# Patient Record
Sex: FEMALE | Race: WHITE | Employment: FULL TIME | URBAN - METROPOLITAN AREA
[De-identification: names, ages, dates, MRNs, and addresses within clinical notes are randomized per-mention and may not be internally consistent; named-entity substitution may affect disease eponyms.]

---

## 2018-09-26 ENCOUNTER — OFFICE VISIT (OUTPATIENT)
Dept: URGENT CARE | Facility: URGENT CARE | Age: 53
End: 2018-09-26
Payer: COMMERCIAL

## 2018-09-26 ENCOUNTER — RADIANT APPOINTMENT (OUTPATIENT)
Dept: GENERAL RADIOLOGY | Facility: CLINIC | Age: 53
End: 2018-09-26
Attending: PHYSICIAN ASSISTANT
Payer: COMMERCIAL

## 2018-09-26 VITALS
DIASTOLIC BLOOD PRESSURE: 82 MMHG | OXYGEN SATURATION: 100 % | WEIGHT: 110 LBS | HEART RATE: 69 BPM | SYSTOLIC BLOOD PRESSURE: 118 MMHG

## 2018-09-26 DIAGNOSIS — M25.532 LEFT WRIST PAIN: ICD-10-CM

## 2018-09-26 DIAGNOSIS — S62.102A WRIST FRACTURE, LEFT, CLOSED, INITIAL ENCOUNTER: Primary | ICD-10-CM

## 2018-09-26 PROCEDURE — 99204 OFFICE O/P NEW MOD 45 MIN: CPT | Performed by: PHYSICIAN ASSISTANT

## 2018-09-26 PROCEDURE — 73110 X-RAY EXAM OF WRIST: CPT | Mod: LT

## 2018-09-26 RX ORDER — HYDROCODONE BITARTRATE AND ACETAMINOPHEN 5; 325 MG/1; MG/1
1 TABLET ORAL EVERY 4 HOURS PRN
Qty: 8 TABLET | Refills: 0 | Status: SHIPPED | OUTPATIENT
Start: 2018-09-26

## 2018-09-26 NOTE — PATIENT INSTRUCTIONS
Buckle (Torus) Fracture of an Arm  Your child has a broken bone (fracture) in the forearm (radius or ulna bone). This is a very common fracture in children. Because of a child s softer bones, one side of the bone might buckle or bend without any break in the other side. This injury is also called an incomplete fracture for this reason. It s also called a torus fracture.  These fractures heal faster than complete fractures. But your child will need to wear a splint or cast for at least 3 weeks. It may take 6 to 8 weeks for the fracture to heal.    Home care  Follow these guidelines when caring for your child at home:    Your child will be given a splint or cast to keep the arm from moving. Keep your child's arm elevated to reduce pain and swelling. When your child is sitting or lying down, keep the arm above heart level. You can do this by placing the arm on a pillow that rests on your child s chest or on a pillow at your child's side. This is most important during the first 2 days (48 hours) after the injury. Be sure that the pillows do not move near the face of the infant or toddler. Never leave your child unsupervised.    Put an ice pack on the injured area. Do this for 20 minutes every 1 to 2 hours the first day for pain relief. You can make an ice pack by wrapping a plastic bag of ice cubes in a thin towel. As the ice melts, be careful that the cast or splint doesn t get wet. You can put the ice pack inside the sling and directly over the splint or cast. Continue using the ice pack 3 to 4 times a day for the next 2 days. Then use the ice pack as needed to ease pain and swelling.    Keep the cast or splint completely dry at all times. Have your child bathe with the cast or splint out of the water. Protect it with a large plastic bag, taped or rubber-banded at the top end. If a fiberglass cast or splint gets wet, you can dry it with a hair dryer on the cool setting.    You may give your child acetaminophen or  ibuprofen to control pain, unless another pain medicine was prescribed. If your child has chronic liver or kidney disease, talk with the healthcare provider before using these medicines. Also talk with the provider if your child has had a stomach ulcer or gastrointestinal bleeding. Don t give ibuprofen to a child younger than 6 months of age.    Don t put creams, lotions, or objects under the cast.  Follow-up care  Follow up with your child s healthcare provider, or as advised.This is to make sure the bone is healing the way it should. If your child was given a splint, it may be changed to a cast at the follow-up visit.  When to seek medical advice  Call your child s healthcare provider right away if any of these occur:    The cast or splint cracks    The plaster cast or splint becomes wet or soft    The fiberglass cast or splint stays wet for more than 24 hours    Tightness or pain under the cast or splint gets worse    Fingers become swollen, cold, blue, numb, or tingly    Your child can t move the fingers on the injured arm    Skin around cast becomes red, swollen, or irritated  Also call your child s provider right away if your child has a fever (see Fever and children, below)     Fever and children  Always use a digital thermometer to check your child s temperature. Never use a mercury thermometer.  For infants and toddlers, be sure to use a rectal thermometer correctly. A rectal thermometer may accidentally poke a hole in (perforate) the rectum. It may also pass on germs from the stool. Always follow the product maker s directions for proper use. If you don t feel comfortable taking a rectal temperature, use another method. When you talk to your child s healthcare provider, tell him or her which method you used to take your child s temperature.  Here are guidelines for fever temperature. Ear temperatures aren t accurate before 6 months of age. Don t take an oral temperature until your child is at least 4 years  old.  Infant under 3 months old:    Ask your child s healthcare provider how you should take the temperature.    Rectal or forehead (temporal artery) temperature of 100.4 F (38 C) or higher, or as directed by the provider    Armpit temperature of 99 F (37.2 C) or higher, or as directed by the provider  Child age 3 to 36 months:    Rectal, forehead (temporal artery), or ear temperature of 102 F (38.9 C) or higher, or as directed by the provider    Armpit temperature of 101 F (38.3 C) or higher, or as directed by the provider  Child of any age:    Repeated temperature of 104 F (40 C) or higher, or as directed by the provider    Fever that lasts more than 24 hours in a child under 2 years old. Or a fever that lasts for 3 days in a child 2 years or older.      Date Last Reviewed: 5/1/2017 2000-2017 The "[employer]. [address] [place] [address]. All rights reserved. This information is not intended as a substitute for professional medical care. Always follow your healthcare professional's instructions.

## 2018-09-26 NOTE — PROGRESS NOTES
SUBJECTIVE:  Chief Complaint   Patient presents with     Urgent Care     Wrist Injury     Pt states collided with someone on the tennis court and fell on left wrist.      Tiffanie Alcala is a 53 year old female presents with a chief complaint of left wrist pain, swelling, tenderness and decreased range of motion.  The injury occurred 1 hour(s) ago.   The injury happened while playing tennis. Another player slammed into the patient knocking her to the ground, FOOSH with other players weight on her.  The patient complained of moderate pain  and has had decreased ROM.  Pain exacerbated by movment.  Relieved by rest.  She treated it initially with no therapy. This is the first time this type of injury has occurred to this patient.     Personal and family hisotry of osteoarthritis      No past medical history on file.  No current outpatient prescriptions on file.     Social History   Substance Use Topics     Smoking status: Never Smoker     Smokeless tobacco: Never Used     Alcohol use Not on file       ROS:  Review of systems negative except as stated above.    EXAM:   /82 (BP Location: Right arm, Patient Position: Chair, Cuff Size: Adult Regular)  Pulse 69  Wt 110 lb (49.9 kg)  SpO2 100%  Gen: healthy,alert,no distress  Extremity: tenderness, swelling at distal radius.  Digit ROM and elbow ROM intact.  Pain with f/e/s/p of wrist  GENERAL APPEARANCE: healthy, alert and no distress  CHEST: clear to auscultation  CV: regular rate and rhythm  EXTREMITIES: peripheral pulses normal  SKIN: no suspicious lesions or rashes  NEURO: Normal strength and tone, sensory exam grossly normal, mentation intact and speech normal    X-RAY indicates torus fracture of distal radius    ASSESSMENT:   (M25.532) Left wrist pain  (primary encounter diagnosis)  Plan: XR Wrist Left G/E 3 Views, order for DME      (S62.102A) Wrist fracture, left, closed, initial encounter  Plan: order for DME, ORTHO  REFERRAL,          HYDROcodone-acetaminophen (NORCO) 5-325 MG per         tablet      Patient Instructions       Buckle (Torus) Fracture of an Arm  Your child has a broken bone (fracture) in the forearm (radius or ulna bone). This is a very common fracture in children. Because of a child s softer bones, one side of the bone might buckle or bend without any break in the other side. This injury is also called an incomplete fracture for this reason. It s also called a torus fracture.  These fractures heal faster than complete fractures. But your child will need to wear a splint or cast for at least 3 weeks. It may take 6 to 8 weeks for the fracture to heal.    Home care  Follow these guidelines when caring for your child at home:    Your child will be given a splint or cast to keep the arm from moving. Keep your child's arm elevated to reduce pain and swelling. When your child is sitting or lying down, keep the arm above heart level. You can do this by placing the arm on a pillow that rests on your child s chest or on a pillow at your child's side. This is most important during the first 2 days (48 hours) after the injury. Be sure that the pillows do not move near the face of the infant or toddler. Never leave your child unsupervised.    Put an ice pack on the injured area. Do this for 20 minutes every 1 to 2 hours the first day for pain relief. You can make an ice pack by wrapping a plastic bag of ice cubes in a thin towel. As the ice melts, be careful that the cast or splint doesn t get wet. You can put the ice pack inside the sling and directly over the splint or cast. Continue using the ice pack 3 to 4 times a day for the next 2 days. Then use the ice pack as needed to ease pain and swelling.    Keep the cast or splint completely dry at all times. Have your child bathe with the cast or splint out of the water. Protect it with a large plastic bag, taped or rubber-banded at the top end. If a fiberglass cast or splint gets wet, you can  dry it with a hair dryer on the cool setting.    You may give your child acetaminophen or ibuprofen to control pain, unless another pain medicine was prescribed. If your child has chronic liver or kidney disease, talk with the healthcare provider before using these medicines. Also talk with the provider if your child has had a stomach ulcer or gastrointestinal bleeding. Don t give ibuprofen to a child younger than 6 months of age.    Don t put creams, lotions, or objects under the cast.  Follow-up care  Follow up with your child s healthcare provider, or as advised.This is to make sure the bone is healing the way it should. If your child was given a splint, it may be changed to a cast at the follow-up visit.  When to seek medical advice  Call your child s healthcare provider right away if any of these occur:    The cast or splint cracks    The plaster cast or splint becomes wet or soft    The fiberglass cast or splint stays wet for more than 24 hours    Tightness or pain under the cast or splint gets worse    Fingers become swollen, cold, blue, numb, or tingly    Your child can t move the fingers on the injured arm    Skin around cast becomes red, swollen, or irritated  Also call your child s provider right away if your child has a fever (see Fever and children, below)     Fever and children  Always use a digital thermometer to check your child s temperature. Never use a mercury thermometer.  For infants and toddlers, be sure to use a rectal thermometer correctly. A rectal thermometer may accidentally poke a hole in (perforate) the rectum. It may also pass on germs from the stool. Always follow the product maker s directions for proper use. If you don t feel comfortable taking a rectal temperature, use another method. When you talk to your child s healthcare provider, tell him or her which method you used to take your child s temperature.  Here are guidelines for fever temperature. Ear temperatures aren t accurate  before 6 months of age. Don t take an oral temperature until your child is at least 4 years old.  Infant under 3 months old:    Ask your child s healthcare provider how you should take the temperature.    Rectal or forehead (temporal artery) temperature of 100.4 F (38 C) or higher, or as directed by the provider    Armpit temperature of 99 F (37.2 C) or higher, or as directed by the provider  Child age 3 to 36 months:    Rectal, forehead (temporal artery), or ear temperature of 102 F (38.9 C) or higher, or as directed by the provider    Armpit temperature of 101 F (38.3 C) or higher, or as directed by the provider  Child of any age:    Repeated temperature of 104 F (40 C) or higher, or as directed by the provider    Fever that lasts more than 24 hours in a child under 2 years old. Or a fever that lasts for 3 days in a child 2 years or older.      Date Last Reviewed: 5/1/2017 2000-2017 [employer]. [address] [place] [address], [place] [address]. All rights reserved. This information is not intended as a substitute for professional medical care. Always follow your healthcare professional's instructions.

## 2018-09-26 NOTE — MR AVS SNAPSHOT
After Visit Summary   9/26/2018    Tiffanie Alcala    MRN: [id number]           Patient Information     Date Of Birth          1965        Visit Information        Provider Department      9/26/2018 12:00 PM Gaetano Arita PA-C [facility]        Today's Diagnoses     Left wrist pain    -  1    Wrist fracture, left, closed, initial encounter          Care Instructions      Buckle (Torus) Fracture of an Arm  Your child has a broken bone (fracture) in the forearm (radius or ulna bone). This is a very common fracture in children. Because of a child s softer bones, one side of the bone might buckle or bend without any break in the other side. This injury is also called an incomplete fracture for this reason. It s also called a torus fracture.  These fractures heal faster than complete fractures. But your child will need to wear a splint or cast for at least 3 weeks. It may take 6 to 8 weeks for the fracture to heal.    Home care  Follow these guidelines when caring for your child at home:    Your child will be given a splint or cast to keep the arm from moving. Keep your child's arm elevated to reduce pain and swelling. When your child is sitting or lying down, keep the arm above heart level. You can do this by placing the arm on a pillow that rests on your child s chest or on a pillow at your child's side. This is most important during the first 2 days (48 hours) after the injury. Be sure that the pillows do not move near the face of the infant or toddler. Never leave your child unsupervised.    Put an ice pack on the injured area. Do this for 20 minutes every 1 to 2 hours the first day for pain relief. You can make an ice pack by wrapping a plastic bag of ice cubes in a thin towel. As the ice melts, be careful that the cast or splint doesn t get wet. You can put the ice pack inside the sling and directly over the splint or cast. Continue using the ice pack 3  to 4 times a day for the next 2 days. Then use the ice pack as needed to ease pain and swelling.    Keep the cast or splint completely dry at all times. Have your child bathe with the cast or splint out of the water. Protect it with a large plastic bag, taped or rubber-banded at the top end. If a fiberglass cast or splint gets wet, you can dry it with a hair dryer on the cool setting.    You may give your child acetaminophen or ibuprofen to control pain, unless another pain medicine was prescribed. If your child has chronic liver or kidney disease, talk with the healthcare provider before using these medicines. Also talk with the provider if your child has had a stomach ulcer or gastrointestinal bleeding. Don t give ibuprofen to a child younger than 6 months of age.    Don t put creams, lotions, or objects under the cast.  Follow-up care  Follow up with your child s healthcare provider, or as advised.This is to make sure the bone is healing the way it should. If your child was given a splint, it may be changed to a cast at the follow-up visit.  When to seek medical advice  Call your child s healthcare provider right away if any of these occur:    The cast or splint cracks    The plaster cast or splint becomes wet or soft    The fiberglass cast or splint stays wet for more than 24 hours    Tightness or pain under the cast or splint gets worse    Fingers become swollen, cold, blue, numb, or tingly    Your child can t move the fingers on the injured arm    Skin around cast becomes red, swollen, or irritated  Also call your child s provider right away if your child has a fever (see Fever and children, below)     Fever and children  Always use a digital thermometer to check your child s temperature. Never use a mercury thermometer.  For infants and toddlers, be sure to use a rectal thermometer correctly. A rectal thermometer may accidentally poke a hole in (perforate) the rectum. It may also pass on germs from the stool.  Always follow the product maker s directions for proper use. If you don t feel comfortable taking a rectal temperature, use another method. When you talk to your child s healthcare provider, tell him or her which method you used to take your child s temperature.  Here are guidelines for fever temperature. Ear temperatures aren t accurate before 6 months of age. Don t take an oral temperature until your child is at least 4 years old.  Infant under 3 months old:    Ask your child s healthcare provider how you should take the temperature.    Rectal or forehead (temporal artery) temperature of 100.4 F (38 C) or higher, or as directed by the provider    Armpit temperature of 99 F (37.2 C) or higher, or as directed by the provider  Child age 3 to 36 months:    Rectal, forehead (temporal artery), or ear temperature of 102 F (38.9 C) or higher, or as directed by the provider    Armpit temperature of 101 F (38.3 C) or higher, or as directed by the provider  Child of any age:    Repeated temperature of 104 F (40 C) or higher, or as directed by the provider    Fever that lasts more than 24 hours in a child under 2 years old. Or a fever that lasts for 3 days in a child 2 years or older.      Date Last Reviewed: 5/1/2017 2000-2017 [employer]. [address] [place] [address], [place] [address]. All rights reserved. This information is not intended as a substitute for professional medical care. Always follow your healthcare professional's instructions.                Follow-ups after your visit        Additional Services     ORTHO  REFERRAL       [facility] is referring you to the Orthopedic  Services at [facility].       The  Representative will assist you in the coordination of your Orthopedic and Musculoskeletal Care as prescribed by your physician.    The  Representative will call you within 1 business day to help schedule your appointment, or  "you may contact the [facility] Representative at:    All areas ~ [phone]     Type of Referral : Surgical / Specialist       Timeframe requested: 3 - 5 days    Coverage of these services is subject to the terms and limitations of your health insurance plan.  Please call member services at your health plan with any benefit or coverage questions.      If X-rays, CT or MRI's have been performed, please contact the facility where they were done to arrange for , prior to your scheduled appointment.  Please bring this referral request to your appointment and present it to your specialist.                  Who to contact     If you have questions or need follow up information about today's clinic visit or your schedule please contact [facility] directly at [phone].  Normal or non-critical lab and imaging results will be communicated to you by [employer], letter or phone within 4 business days after the clinic has received the results. If you do not hear from us within 7 days, please contact the clinic through [employer] or phone. If you have a critical or abnormal lab result, we will notify you by phone as soon as possible.  Submit refill requests through [employer] or call your pharmacy and they will forward the refill request to us. Please allow 3 business days for your refill to be completed.          Additional Information About Your Visit        [employer] Information     [employer] lets you send messages to your doctor, view your test results, renew your prescriptions, schedule appointments and more. To sign up, go to [email or web address] . Click on \"Log in\" on the left side of the screen, which will take you to the Welcome page. Then click on \"Sign up Now\" on the right side of the page.     You will be asked to enter the access code listed below, as well as some personal information. Please follow the directions to create your username and password.     Your access code is: " [email or web address]  Expires: 2018 12:48 PM     Your access code will  in 90 days. If you need help or a new code, please call your [place] clinic or [phone].        Care EveryWhere ID     This is your Care EveryWhere ID. This could be used by other organizations to access your [facility] medical records  [id number]        Your Vitals Were     Pulse Pulse Oximetry                69 100%           Blood Pressure from Last 3 Encounters:   18 118/82    Weight from Last 3 Encounters:   18 110 lb (49.9 kg)              We Performed the Following     ORTHO  REFERRAL          Today's Medication Changes          These changes are accurate as of 18 12:48 PM.  If you have any questions, ask your nurse or doctor.               Start taking these medicines.        Dose/Directions    order for DME   Used for:  Left wrist pain, Wrist fracture, left, closed, initial encounter   Started by:  Gaetano Arita PA-C        Equipment being ordered: wrist splint   Quantity:  1 Device   Refills:  0            Where to get your medicines      Some of these will need a paper prescription and others can be bought over the counter.  Ask your nurse if you have questions.     Bring a paper prescription for each of these medications     order for DME                Primary Care Provider Fax #    Nikhil Benedictet [phone]       No address on file        Equal Access to Services     SERGIO LIPSCOMB AH: Hadii aad ku hadasho Soomaali, waaxda luqadaha, qaybta kaalmada adeegyada, max blackmon. So [facility] [phone].    ATENCIÓN: Si habla español, tiene a farah disposición servicios gratuitos de asistencia lingüística. Llame al [phone].    We comply with applicable federal civil rights laws and [place] laws. We do not discriminate on the basis of race, color, national origin, age, disability, sex, sexual orientation, or gender identity.            Thank you!     Thank you  for choosing [facility]  for your care. Our goal is always to provide you with excellent care. Hearing back from our patients is one way we can continue to improve our services. Please take a few minutes to complete the written survey that you may receive in the mail after your visit with us. Thank you!             Your Updated Medication List - Protect others around you: Learn how to safely use, store and throw away your medicines at www.disposemymeds.org.          This list is accurate as of 9/26/18 12:48 PM.  Always use your most recent med list.                   Brand Name Dispense Instructions for use Diagnosis    order for DME     1 Device    Equipment being ordered: wrist splint    Left wrist pain, Wrist fracture, left, closed, initial encounter

## 2018-12-19 ENCOUNTER — THERAPY VISIT (OUTPATIENT)
Dept: OCCUPATIONAL THERAPY | Facility: CLINIC | Age: 53
End: 2018-12-19
Payer: COMMERCIAL

## 2018-12-19 DIAGNOSIS — M79.644 PAIN IN FINGER OF BOTH HANDS: ICD-10-CM

## 2018-12-19 DIAGNOSIS — M79.645 PAIN IN FINGER OF BOTH HANDS: ICD-10-CM

## 2018-12-19 DIAGNOSIS — M79.642 BILATERAL HAND PAIN: ICD-10-CM

## 2018-12-19 DIAGNOSIS — M79.641 BILATERAL HAND PAIN: ICD-10-CM

## 2018-12-19 PROCEDURE — 97110 THERAPEUTIC EXERCISES: CPT | Mod: GO | Performed by: OCCUPATIONAL THERAPIST

## 2018-12-19 PROCEDURE — 97165 OT EVAL LOW COMPLEX 30 MIN: CPT | Mod: GO | Performed by: OCCUPATIONAL THERAPIST

## 2018-12-19 NOTE — LETTER
[facility]  [address] [facility]  [address]  [facility] [address]  [phone]    2018    Re: Tiffanie Alcala   :   1965  MRN:  [id number]   REFERRING PHYSICIAN:   Pavan [facility]ER [facility]    Date of Initial Evaluation:  2018  Visits:  Rxs Used: 1  Reason for Referral:     Bilateral hand pain  Pain in finger of both hands    EVALUATION SUMMARY    Hand Therapy Initial Evaluation  Current Date:  2018    Subjective:  Tiffanie Alcala is a 53 year old right hand dominant female.  Diagnosis: B finger and hand pain (middle fingers worst B)  DOI:  10 years ago (MD order date 10/3/18)  Patient reports symptoms of pain, stiffness/loss of motion, weakness/loss of strength, edema, numbness and tingling  of the B hands which occurred due to gradual onset. Since onset symptoms are gradually getting worse. Special tests:  none recently.  Previous treatment: glucosamine chondroitin (1-2 mos), paraffin, pain meds (steroid cream)--no significant change. General health as reported by patient is excellent.  Pertinent medical history includes: Anemia, Osteoarthritis.  Medical allergies: sulfa.  Surgical history: orthopedic: R wrist ganglion exicision.  Medication history: Vitamin D.  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, Prolonged Standing  Prior functional level:  independent-shared household chores  Barriers include:none  Mobility: No difficulty  Transportation: drives  Leisure activities/hobbies: tennis, reading, gardening, plays piano  Upper Extremity Functional Index Score:  SCORE:   Column Totals: /80: 72   (A lower score indicates greater disability.)  O:  Pain Level Report: On scale 0-10/10  Date 18    Side B    Overall 3-4    At Rest 2    With Activity 6    Re: Tiffanie Alcala   :   1965    Primary Report:  location and description  Date 18    Side B    Location Long fingers laterally and index at times    Radiation none    Pain Quality Achy, dull, sharp at times    Frequency Constant, but intensity fluctuates    Duration Waking up in the morning (stiffiness)    Exacerbated by  Lifting, gripping, twisting, pinching, pulling    Relieved by Warmth, warm water    Progression since onset Gradually worsening      Sensation: Numbness and tingling present in the hand (hypothenar eminence) that is intermittent    Edema:  Circumference (measured in mm)  Index   Date 18   Side R L   P1 5.2 5.0   IP 5.7 5.9   P2 4.5 5.2     Long   Date 18   Side R L   P1 5.6 5.2   IP 6.5 6.2   P2 5.0 5.5     Ring   Date 18   Side R L   P1 4.5 4.5     Small   Date 18   Side R L   P1 4.2 4.1     Thumb  Date 18   Side R L   P1 5.4 5.1   IP 5.7 5.4   P2 4.9 4.7   Re: Tiffaniesa MARIANA Alcala   :   1965    AROM of Fingers AROM (PROM):  Index Range of Motion  Date 2018   Side R L   MP ext 0 0   MP flex 92 77   PIP ext -7 -7   PIP flex 86 65   DIP ext 0 0   DIP flex 45 42    177   Long Range of Motion  Date 2018   Side R L   MP ext 0 0   MP flex 94 97   PIP ext -13 -13   PIP flex 92 91   DIP ext 0 0   DIP flex 53 49    224   Ring Range of Motion  Date 2018   Side R L   MP ext 0 0   MP flex WNL WNL   PIP ext 0 0   PIP flex WL WNL   DIP ext 0 0   DIP flex WNL WNL   DYE     Small Range of Motion  Date 2018   Side R L   MP ext 0 0   MP flex WNL WNL   PIP ext 0 0   PIP flex WNL WNL   DIP ext 0 0   DIP flex WNL WNL   DYE     Thumb Range of Motion AROM (PROM): WNL     STRENGTH: (Measured in pounds, pain scale 0-10/10)    Date 2018        Trials Left Right Left Right Left Right Left Right Left Right Left Right   1 32 42             2               3               Avg               Pain                  Re: Tiffanie Alcala   :   1965    3 Point Pinch  Date 2018        Trials Left Right Left Right Left Right Left Right Left Right Left Right   1 6 12             2               3               Avg               Pain               Lateral Pinch  Date 2018        Trials Left Right Left Right Left Right Left Right Left Right Left Right   1 9 12             2               3               Avg               Pain               Assessment/Plan:  Patient presents with symptoms consistent with diagnosis of B hand and finger pain, with conservative intervention.     Patient's limitations or Problem List includes:  Pain, Decreased ROM/motion, Increased edema, Weakness and Sensory disturbance of the bilateral hand, index finger and long finger which interferes with the patient's ability to perform Self Care Tasks (dressing, eating, bathing), Work Tasks, Recreational Activities, Household Chores and Driving  as compared to previous level of function.    Rehab Potential:  Excellent - Return to full activity, no limitations    Patient will benefit from skilled Occupational Therapy to increase ROM, flexibility,  strength and pinch strength and decrease pain and edema to return to previous activity level and resume normal daily tasks and to reach their rehab potential.    Barriers to Learning:  No barrier    Communication Issues:  Patient appears to be able to clearly communicate and understand verbal and written communication and follow directions correctly.    Assessment of Occupational Performance:  5 or more Performance Deficits  Identified Performance Deficits: bathing/showering, feeding, health management and maintenance, home establishment and management, meal preparation and cleanup, shopping and work      Clinical Decision Making (Complexity): Low complexity    Treatment Explanation:  The following has been discussed with the patient:  RX ordered/plan of care  Anticipated  outcomes  Possible risks and side effects    P: Frequency:  1 X week, once daily  Duration:  for 12 weeks          Re: Tiffanie Alcala   :   1965    Treatment Plan:  Therapeutic Exercise:  AROM, PROM, Tendon Gliding, Blocking, Place and Hold, Isotonics and Isometrics  Neuromuscular re-education:  Nerve Gliding, Kinesthetic Training and Proprioceptive Training  Manual Techniques:  Joint mobilization, Myofascial release and Manual edema mobilization  Orthotic Fabrication:  Static orthosis and Finger based orthosis  Discharge Plan:  Achieve all LTG.  Independent in home treatment program.  Reach maximal therapeutic benefit.    Home Exercise Program:  A/PROM  Tendon glides  Ulnar nerve glides  Coban on fingers at night    Next Visit:  MFR  PROM  Progress HEP  Fabricate orthoses in 60 min appt    Thank you for your referral.    INQUIRIES  Therapist: CAN Cobb, OTR/L, CHT  [facility]  [address]   [address] [place] [address]  Phone: [phone]  Fax: [phone]

## 2018-12-19 NOTE — PROGRESS NOTES
Hand Therapy Initial Evaluation  Current Date:  12/19/2018    Subjective:  Tiffanie Alcala is a 53 year old right hand dominant female.    Diagnosis: B finger and hand pain (middle fingers worst B)  DOI:  10 years ago (MD order date 10/3/18)    Patient reports symptoms of pain, stiffness/loss of motion, weakness/loss of strength, edema, numbness and tingling  of the B hands which occurred due to gradual onset. Since onset symptoms are gradually getting worse. Special tests:  none recently.  Previous treatment: glucosamine chondroitin (1-2 mos), paraffin, pain meds (steroid cream)--no significant change. General health as reported by patient is excellent.  Pertinent medical history includes: Anemia, Osteoarthritis.  Medical allergies: sulfa.  Surgical history: orthopedic: R wrist ganglion exicision.  Medication history: Vitamin D.    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, Prolonged Standing  Prior functional level:  independent-shared household chores  Barriers include:none  Mobility: No difficulty  Transportation: drives  Leisure activities/hobbies: tennis, reading, gardening, plays piano    Upper Extremity Functional Index Score:  SCORE:   Column Totals: /80: 72   (A lower score indicates greater disability.)    O:  Pain Level Report: On scale 0-10/10  Date 12/19/18    Side B    Overall 3-4    At Rest 2    With Activity 6      Primary Report: location and description  Date 12/19/18    Side B    Location Long fingers laterally and index at times    Radiation none    Pain Quality Achy, dull, sharp at times    Frequency Constant, but intensity fluctuates    Duration Waking up in the morning (stiffiness)    Exacerbated by  Lifting, gripping, twisting, pinching, pulling    Relieved by Warmth, warm water    Progression since onset Gradually worsening      Sensation: Numbness and tingling present in the  hand (hypothenar eminence) that is intermittent    Edema:  Circumference (measured in mm)  Index   Date 12/19/18 12/19/18   Side R L   P1 5.2 5.0   IP 5.7 5.9   P2 4.5 5.2     Long   Date 12/19/18 12/19/18   Side R L   P1 5.6 5.2   IP 6.5 6.2   P2 5.0 5.5     Ring   Date 12/19/18 12/19/18   Side R L   P1 4.5 4.5     Small   Date 12/19/18 12/19/18   Side R L   P1 4.2 4.1     Thumb  Date 12/19/18 12/19/18   Side R L   P1 5.4 5.1   IP 5.7 5.4   P2 4.9 4.7     AROM of Fingers AROM (PROM):  Index Range of Motion  Date 12/19/2018 12/19/18   Side R L   MP ext 0 0   MP flex 92 77   PIP ext -7 -7   PIP flex 86 65   DIP ext 0 0   DIP flex 45 42    177     Long Range of Motion  Date 12/19/2018 12/19/18   Side R L   MP ext 0 0   MP flex 94 97   PIP ext -13 -13   PIP flex 92 91   DIP ext 0 0   DIP flex 53 49    224     Ring Range of Motion  Date 12/19/2018 12/19/18   Side R L   MP ext 0 0   MP flex WNL WNL   PIP ext 0 0   PIP flex WL WNL   DIP ext 0 0   DIP flex WNL WNL   DYE       Small Range of Motion  Date 12/19/2018 12/19/18   Side R L   MP ext 0 0   MP flex WNL WNL   PIP ext 0 0   PIP flex WNL WNL   DIP ext 0 0   DIP flex WNL WNL   DYE       Thumb Range of Motion AROM (PROM): WNL     STRENGTH: (Measured in pounds, pain scale 0-10/10)    Date 12/19/2018        Trials Left Right Left Right Left Right Left Right Left Right Left Right   1 32 42             2               3               Avg               Pain                 3 Point Pinch  Date 12/19/2018        Trials Left Right Left Right Left Right Left Right Left Right Left Right   1 6 12             2               3               Avg               Pain                 Lateral Pinch  Date 12/19/2018        Trials Left Right Left Right Left Right Left Right Left Right Left Right   1 9 12             2               3               Avg               Pain                 Assessment/Plan:  Patient presents with symptoms consistent with diagnosis of B hand  and finger pain, with conservative intervention.     Patient's limitations or Problem List includes:  Pain, Decreased ROM/motion, Increased edema, Weakness and Sensory disturbance of the bilateral hand, index finger and long finger which interferes with the patient's ability to perform Self Care Tasks (dressing, eating, bathing), Work Tasks, Recreational Activities, Household Chores and Driving  as compared to previous level of function.    Rehab Potential:  Excellent - Return to full activity, no limitations    Patient will benefit from skilled Occupational Therapy to increase ROM, flexibility,  strength and pinch strength and decrease pain and edema to return to previous activity level and resume normal daily tasks and to reach their rehab potential.    Barriers to Learning:  No barrier    Communication Issues:  Patient appears to be able to clearly communicate and understand verbal and written communication and follow directions correctly.    Assessment of Occupational Performance:  5 or more Performance Deficits  Identified Performance Deficits: bathing/showering, feeding, health management and maintenance, home establishment and management, meal preparation and cleanup, shopping and work      Clinical Decision Making (Complexity): Low complexity    Treatment Explanation:  The following has been discussed with the patient:  RX ordered/plan of care  Anticipated outcomes  Possible risks and side effects    P: Frequency:  1 X week, once daily  Duration:  for 12 weeks    Treatment Plan:  Therapeutic Exercise:  AROM, PROM, Tendon Gliding, Blocking, Place and Hold, Isotonics and Isometrics  Neuromuscular re-education:  Nerve Gliding, Kinesthetic Training and Proprioceptive Training  Manual Techniques:  Joint mobilization, Myofascial release and Manual edema mobilization  Orthotic Fabrication:  Static orthosis and Finger based orthosis  Discharge Plan:  Achieve all LTG.  Independent in home treatment  program.  Reach maximal therapeutic benefit.    Home Exercise Program:  A/PROM  Tendon glides  Ulnar nerve glides  Coban on fingers at night    Next Visit:  MFR  PROM  Progress HEP  Fabricate orthoses in 60 min appt

## 2018-12-28 ENCOUNTER — THERAPY VISIT (OUTPATIENT)
Dept: OCCUPATIONAL THERAPY | Facility: CLINIC | Age: 53
End: 2018-12-28
Payer: COMMERCIAL

## 2018-12-28 DIAGNOSIS — M79.645 PAIN IN FINGER OF BOTH HANDS: ICD-10-CM

## 2018-12-28 DIAGNOSIS — M79.642 BILATERAL HAND PAIN: ICD-10-CM

## 2018-12-28 DIAGNOSIS — M79.641 BILATERAL HAND PAIN: ICD-10-CM

## 2018-12-28 DIAGNOSIS — M79.644 PAIN IN FINGER OF BOTH HANDS: ICD-10-CM

## 2018-12-28 PROCEDURE — 97110 THERAPEUTIC EXERCISES: CPT | Mod: GO | Performed by: OCCUPATIONAL THERAPIST

## 2018-12-28 PROCEDURE — 97140 MANUAL THERAPY 1/> REGIONS: CPT | Mod: GO | Performed by: OCCUPATIONAL THERAPIST

## 2019-02-12 PROBLEM — M79.642 BILATERAL HAND PAIN: Status: RESOLVED | Noted: 2018-12-19 | Resolved: 2019-02-12

## 2019-02-12 PROBLEM — M79.644 PAIN IN FINGER OF BOTH HANDS: Status: RESOLVED | Noted: 2018-12-19 | Resolved: 2019-02-12

## 2019-02-12 PROBLEM — M79.645 PAIN IN FINGER OF BOTH HANDS: Status: RESOLVED | Noted: 2018-12-19 | Resolved: 2019-02-12

## 2019-02-12 PROBLEM — M79.641 BILATERAL HAND PAIN: Status: RESOLVED | Noted: 2018-12-19 | Resolved: 2019-02-12

## 2019-02-12 NOTE — PROGRESS NOTES
Pt has not returned for therapy since 12/28/2018.  Assume all goals are met to pt satisfaction.  D/C [facility].

## 2019-02-15 ENCOUNTER — HEALTH MAINTENANCE LETTER (OUTPATIENT)
Age: 54
End: 2019-02-15

## 2019-02-22 ENCOUNTER — THERAPY VISIT (OUTPATIENT)
Dept: OCCUPATIONAL THERAPY | Facility: CLINIC | Age: 54
End: 2019-02-22
Payer: COMMERCIAL

## 2019-02-22 DIAGNOSIS — M79.644 PAIN IN FINGER OF BOTH HANDS: Primary | ICD-10-CM

## 2019-02-22 DIAGNOSIS — M79.645 PAIN IN FINGER OF BOTH HANDS: Primary | ICD-10-CM

## 2019-02-22 PROCEDURE — 97760 ORTHOTIC MGMT&TRAING 1ST ENC: CPT | Mod: GO | Performed by: OCCUPATIONAL THERAPIST

## 2019-05-29 NOTE — PROGRESS NOTES
Pt has not returned for therapy since 2/22/19.  Assume all goals are met to pt satisfaction.  D/C [facility].

## 2019-10-03 ENCOUNTER — HEALTH MAINTENANCE LETTER (OUTPATIENT)
Age: 54
End: 2019-10-03

## 2020-11-07 ENCOUNTER — HEALTH MAINTENANCE LETTER (OUTPATIENT)
Age: 55
End: 2020-11-07

## 2021-06-09 ENCOUNTER — THERAPY VISIT (OUTPATIENT)
Dept: PHYSICAL THERAPY | Facility: CLINIC | Age: 56
End: 2021-06-09
Payer: COMMERCIAL

## 2021-06-09 DIAGNOSIS — M25.562 LEFT KNEE PAIN: Primary | ICD-10-CM

## 2021-06-09 PROCEDURE — 97161 PT EVAL LOW COMPLEX 20 MIN: CPT | Mod: GP | Performed by: PHYSICAL THERAPIST

## 2021-06-09 PROCEDURE — 97110 THERAPEUTIC EXERCISES: CPT | Mod: GP | Performed by: PHYSICAL THERAPIST

## 2021-06-09 ASSESSMENT — ACTIVITIES OF DAILY LIVING (ADL)
HOW_WOULD_YOU_RATE_THE_OVERALL_FUNCTION_OF_YOUR_KNEE_DURING_YOUR_USUAL_DAILY_ACTIVITIES?: ABNORMAL
GIVING WAY, BUCKLING OR SHIFTING OF KNEE: I DO NOT HAVE THE SYMPTOM
PAIN: THE SYMPTOM AFFECTS MY ACTIVITY MODERATELY
HOW_WOULD_YOU_RATE_THE_CURRENT_FUNCTION_OF_YOUR_KNEE_DURING_YOUR_USUAL_DAILY_ACTIVITIES_ON_A_SCALE_FROM_0_TO_100_WITH_100_BEING_YOUR_LEVEL_OF_KNEE_FUNCTION_PRIOR_TO_YOUR_INJURY_AND_0_BEING_THE_INABILITY_TO_PERFORM_ANY_OF_YOUR_USUAL_DAILY_ACTIVITIES?: 50
KNEE_ACTIVITY_OF_DAILY_LIVING_SCORE: 58.57
KNEE_ACTIVITY_OF_DAILY_LIVING_SUM: 41
GO UP STAIRS: ACTIVITY IS SOMEWHAT DIFFICULT
GO DOWN STAIRS: ACTIVITY IS SOMEWHAT DIFFICULT
SWELLING: THE SYMPTOM AFFECTS MY ACTIVITY SLIGHTLY
KNEEL ON THE FRONT OF YOUR KNEE: I AM UNABLE TO DO THE ACTIVITY
AS_A_RESULT_OF_YOUR_KNEE_INJURY,_HOW_WOULD_YOU_RATE_YOUR_CURRENT_LEVEL_OF_DAILY_ACTIVITY?: ABNORMAL
RAW_SCORE: 41
WEAKNESS: THE SYMPTOM AFFECTS MY ACTIVITY MODERATELY
RISE FROM A CHAIR: ACTIVITY IS MINIMALLY DIFFICULT
SIT WITH YOUR KNEE BENT: ACTIVITY IS MINIMALLY DIFFICULT
LIMPING: THE SYMPTOM AFFECTS MY ACTIVITY SLIGHTLY
STAND: ACTIVITY IS NOT DIFFICULT
SQUAT: ACTIVITY IS VERY DIFFICULT
STIFFNESS: THE SYMPTOM AFFECTS MY ACTIVITY MODERATELY
WALK: ACTIVITY IS MINIMALLY DIFFICULT

## 2021-06-09 NOTE — PROGRESS NOTES
Physical Therapy Initial Evaluation  Subjective:  The history is provided by the patient. No  was used.   Patient Health History  Tiffaniesa MARIANA Alcala being seen for L knee pain.     Problem began: 5/31/2021.   Problem occurred: L knee has felt less flexible for the past several months.  Pt is usually very active and due to covid has not been exercising as much and feels very deconditioned.  Pt had done some swimming and then later in the day noticed limping when going down the stairs and eventually over the night pt noted that she had pain radiating down from her hip to her ankle.     Pain is reported as 2/10 on pain scale.  General health as reported by patient is good.  Pertinent medical history includes: osteoarthritis.   Red flags:  None as reported by patient.             Current occupation is  - able to sit or stand at work.                     Therapist Generated HPI Evaluation  Problem details: No history of back, hip, or knee injuries.  .         Type of problem:  Left knee.          Patient reports pain:  Medial and lateral.  Pain is described as aching and is constant.  Radiates to: lower leg R, R lateral glute. Pain is worse in the A.M..  Since onset symptoms are gradually improving.  Associated symptoms:  Loss of motion/stiffness and loss of strength. Exacerbated by: going down stairs, bending knee.  and relieved by activity/movement.  Special tests included:  X-ray.    Restrictions due to condition include:  Working in normal job without restrictions.  Barriers include:  None as reported by patient.                        Objective:  System                                           Hip Evaluation  HIP AROM:  AROM:    Left Hip:     Normal    Right Hip:   Normal                  Hip PROM:  Hip PROM:  Left Hip:    Normal  Right Hip:  Normal                          Hip Strength:    Flexion:   Left: 4+/5   Pain:  Right: 5/5   Pain:                    Extension:  Left:  5-/5  Pain:Right: 5/5    Pain:    Abduction:  Left: 4/5     Pain:Right: 5-/5    Pain:                           Knee Evaluation:  ROM:    AROM      Extension:  Left: 0    Right:  0  Flexion: Left: 145    Right: 160        Strength:     Extension:  Left: 4+/5   Pain:      Right: 5/5   Pain:    Quad Set Left: Good    Pain:   Quad Set Right: Good    Pain:      Palpation:    Left knee tenderness present at:  Medial Joint Line and Patellar Tendon              General     ROS    Assessment/Plan:    Patient is a 55 year old female with left side knee complaints.    Patient has the following significant findings with corresponding treatment plan.                Diagnosis 1:  L knee pain      Pain -  hot/cold therapy, manual therapy, self management, education and home program  Decreased ROM/flexibility - manual therapy and therapeutic exercise  Decreased strength - therapeutic exercise and therapeutic activities  Impaired muscle performance - neuro re-education  Decreased function - therapeutic activities    Therapy Evaluation Codes:   1) History comprised of:   Personal factors that impact the plan of care:      None.    Comorbidity factors that impact the plan of care are:      None.     Medications impacting care: None.  2) Examination of Body Systems comprised of:   Body structures and functions that impact the plan of care:      Hip and Knee.   Activity limitations that impact the plan of care are:      Squatting/kneeling, Stairs and Walking.  3) Clinical presentation characteristics are:   Evolving/Changing.  4) Decision-Making    Low complexity using standardized patient assessment instrument and/or measureable assessment of functional outcome.  Cumulative Therapy Evaluation is: Low complexity.    Previous and current functional limitations:  (See Goal Flow Sheet for this information)    Short term and Long term goals: (See Goal Flow Sheet for this information)     Communication ability:  Patient appears to be able  to clearly communicate and understand verbal and written communication and follow directions correctly.  Treatment Explanation - The following has been discussed with the patient:   RX ordered/plan of care  Anticipated outcomes  Possible risks and side effects  This patient would benefit from PT intervention to resume normal activities.   Rehab potential is good.    Frequency:  1 X week, once daily  Duration:  for 4 weeks  Discharge Plan:  Achieve all LTG.  Independent in home treatment program.  Reach maximal therapeutic benefit.    Please refer to the daily flowsheet for treatment today, total treatment time and time spent performing 1:1 timed codes.

## 2021-06-09 NOTE — LETTER
[facility]  [address]  [address]  [place] [facility] [address]  [phone]    2021    Re: Tiffanie Alcala   :   1965  MRN:  [id number]   REFERRING PHYSICIAN:   Ronal SMART [facility]  Date of Initial Evaluation:  21  Visits:  Rxs Used: 1  Reason for Referral:  Left knee pain    EVALUATION SUMMARY    Physical Therapy Initial Evaluation  Subjective:  The history is provided by the patient. No  was used.   Patient Health History  Tiffanie Alcala being seen for L knee pain.     Problem began: 2021.   Problem occurred: L knee has felt less flexible for the past several months.  Pt is usually very active and due to covid has not been exercising as much and feels very deconditioned.  Pt had done some swimming and then later in the day noticed limping when going down the stairs and eventually over the night pt noted that she had pain radiating down from her hip to her ankle.     Pain is reported as 2/10 on pain scale.  General health as reported by patient is good.  Pertinent medical history includes: osteoarthritis.   Red flags:  None as reported by patient.         Current occupation is  - able to sit or stand at work.              Therapist Generated HPI Evaluation  Problem details: No history of back, hip, or knee injuries.  .         Type of problem:  Left knee.      Patient reports pain:  Medial and lateral.  Pain is described as aching and is constant.  Radiates to: lower leg R, R lateral glute. Pain is worse in the A.M..  Since onset symptoms are gradually improving.  Associated symptoms:  Loss of motion/stiffness and loss of strength. Exacerbated by: going down stairs, bending knee.  and relieved by activity/movement.  Special tests included:  X-ray.    Restrictions due to condition include:  Working in normal job without restrictions.  Barriers  include:  None as reported by patient.                    Objective:  System         Hip Evaluation  HIP AROM:  AROM:    Left Hip:     Normal    Right Hip:   Normal        Hip PROM:  Hip PROM:  Left Hip:    Normal  Right Hip:  Normal      Hip Strength:    Flexion:   Left: 4+/5   Pain:  Right: 5/5   Pain:                    Extension:  Left: 5-/5  Pain:Right: 5/5    Pain:    Abduction:  Left: 4/5     Pain:Right: 5-/5    Pain:         Knee Evaluation:  ROM:    AROM  Extension:  Left: 0    Right:  0  Flexion: Left: 145    Right: 160    Strength:     Extension:  Left: 4+/5   Pain:      Right: 5/5   Pain:    Quad Set Left: Good    Pain:   Quad Set Right: Good    Pain:    Palpation:    Left knee tenderness present at:  Medial Joint Line and Patellar Tendon      General     ROS    Assessment/Plan:    Patient is a 55 year old female with left side knee complaints.    Patient has the following significant findings with corresponding treatment plan.                Diagnosis 1:  L knee pain      Pain -  hot/cold therapy, manual therapy, self management, education and home program  Decreased ROM/flexibility - manual therapy and therapeutic exercise  Decreased strength - therapeutic exercise and therapeutic activities  Impaired muscle performance - neuro re-education  Decreased function - therapeutic activities    Therapy Evaluation Codes:   1) History comprised of:   Personal factors that impact the plan of care:      None.    Comorbidity factors that impact the plan of care are:      None.     Medications impacting care: None.  2) Examination of Body Systems comprised of:   Body structures and functions that impact the plan of care:      Hip and Knee.   Activity limitations that impact the plan of care are:      Squatting/kneeling, Stairs and Walking.  3) Clinical presentation characteristics are:   Evolving/Changing.  4) Decision-Making    Low complexity using standardized patient assessment instrument and/or measureable  assessment of functional outcome.  Cumulative Therapy Evaluation is: Low complexity.    Previous and current functional limitations:  (See Goal Flow Sheet for this information)    Short term and Long term goals: (See Goal Flow Sheet for this information)     Communication ability:  Patient appears to be able to clearly communicate and understand verbal and written communication and follow directions correctly.  Treatment Explanation - The following has been discussed with the patient:   RX ordered/plan of care  Anticipated outcomes  Possible risks and side effects  This patient would benefit from PT intervention to resume normal activities.   Rehab potential is good.  Frequency:  1 X week, once daily  Duration:  for 4 weeks  Discharge Plan:  Achieve all LTG.  Independent in home treatment program.  Reach maximal therapeutic benefit.  Please refer to the daily flowsheet for treatment today, total treatment time and time spent performing 1:1 timed codes.     Thank you for your referral.    INQUIRIES  Therapist:NAYANA TURK DPT  [address] [address]  Phone: [phone]  Fax: [phone]

## 2021-06-18 ENCOUNTER — THERAPY VISIT (OUTPATIENT)
Dept: PHYSICAL THERAPY | Facility: CLINIC | Age: 56
End: 2021-06-18
Payer: COMMERCIAL

## 2021-06-18 DIAGNOSIS — M25.562 LEFT KNEE PAIN: ICD-10-CM

## 2021-06-18 PROCEDURE — 97110 THERAPEUTIC EXERCISES: CPT | Mod: GP | Performed by: PHYSICAL THERAPIST

## 2021-06-18 PROCEDURE — 97112 NEUROMUSCULAR REEDUCATION: CPT | Mod: GP | Performed by: PHYSICAL THERAPIST

## 2021-09-05 ENCOUNTER — HEALTH MAINTENANCE LETTER (OUTPATIENT)
Age: 56
End: 2021-09-05

## 2021-10-05 PROBLEM — M25.562 LEFT KNEE PAIN: Status: RESOLVED | Noted: 2021-06-09 | Resolved: 2021-10-05

## 2021-10-05 NOTE — PROGRESS NOTES
Discharge Note    Progress reporting period is from initial evaluation date (please see noted date below) to Jun 18, 2021.  No linked episodes      Tiffanie failed to follow up and current status is unknown.  Please see information below for last relevant information on current status.  Patient seen for 2 visits.    SUBJECTIVE  Subjective changes noted by patient:  Pt is working through HEP, pain has persisted in L lower leg.  Pt reports pain in L hip to L lower leg with driving, a lot of aching in Llower leg.  L knee flex 145.  .  Current pain level is  .     Previous pain level was   .   Changes in function:  Yes (See Goal flowsheet attached for changes in current functional level)  Adverse reaction to treatment or activity: None    OBJECTIVE  Changes noted in objective findings: TROM: flex wnl, ext min loss, SG R wnl L low back pain, SG L min loss - L low back pain.       ASSESSMENT/PLAN  Diagnosis: L knee pain   STG/LTGs have been met or progress has been made towards goals:  Yes, please see goal flowsheet for most current information  Assessment of Progress: current status is unknown.    Last current status: Pt has not made progress   Self Management Plans:  HEP  I have re-evaluated this patient and find that the nature, scope, duration and intensity of the therapy is appropriate for the medical condition of the patient.  Tiffanie continues to require the following intervention to meet STG and LTG's:  HEP.    Recommendations:  Discharge with current home program.  Patient to follow up with MD as needed.    Please refer to the daily flowsheet for treatment today, total treatment time and time spent performing 1:1 timed codes.

## 2021-10-31 ENCOUNTER — HEALTH MAINTENANCE LETTER (OUTPATIENT)
Age: 56
End: 2021-10-31

## 2021-12-01 ENCOUNTER — THERAPY VISIT (OUTPATIENT)
Dept: OCCUPATIONAL THERAPY | Facility: CLINIC | Age: 56
End: 2021-12-01
Payer: COMMERCIAL

## 2021-12-01 DIAGNOSIS — M79.642 PAIN IN BOTH HANDS: ICD-10-CM

## 2021-12-01 DIAGNOSIS — M79.641 PAIN IN BOTH HANDS: ICD-10-CM

## 2021-12-01 DIAGNOSIS — M15.4 EROSIVE OSTEOARTHRITIS OF HANDS, BILATERAL: Primary | ICD-10-CM

## 2021-12-01 PROCEDURE — 97110 THERAPEUTIC EXERCISES: CPT | Mod: GO | Performed by: OCCUPATIONAL THERAPIST

## 2021-12-01 PROCEDURE — 97165 OT EVAL LOW COMPLEX 30 MIN: CPT | Mod: GO | Performed by: OCCUPATIONAL THERAPIST

## 2021-12-01 PROCEDURE — 97760 ORTHOTIC MGMT&TRAING 1ST ENC: CPT | Mod: GO | Performed by: OCCUPATIONAL THERAPIST

## 2021-12-01 NOTE — LETTER
[facility]  [address]  [address]  [facility] [address]  [phone]    2021    Re: Tiffanie Alcala   :   1965  MRN:  [id number]   REFERRING PHYSICIAN:   Jacquelyn Irizarry MD    [facility]    Date of Initial Evaluation:  2021  Visits:  Rxs Used: 1  Reason for Referral:     Erosive osteoarthritis of hands, bilateral  Pain in both hands    EVALUATION SUMMARY    Hand Therapy Initial Evaluation  Current Date:  2021    Subjective:  Tiffanie Alcala is a 56 year old right hand dominant female.    Diagnosis: B Hand Erosive OA  DOI:  21 (MD order date)    Patient reports symptoms of pain, stiffness/loss of motion, weakness/loss of strength and edema of the B hands which occurred due to gradual onset. Since onset symptoms are gradually getting worse. Special tests:  OA testing.  Previous treatment: Hand Therapy here. General health as reported by patient is excellent.  Pertinent medical history includes: Anemia, Menopausal, Osteoarthritis.  Medical allergies: Sulfa.  Surgical history: none.  Medication history: None.    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  Prior functional level:  no limitations  Barriers include:none  Mobility: No difficulty  Transportation: drives  Leisure activities/hobbies: tennis, reading, gardening, piano    Upper Extremity Functional Index Score:  SCORE:   Column Totals: /80: 76   (A lower score indicates greater disability.)                Re: Tiffanie Alcala   :   1965    Pain Level (Scale 0-10):   2021   At Rest 1-2   With Use 3 after cortisone, 6 prior to cortisone     Pain Description:  Date 2021   Location B Index and Long, R small finger, B thumbs   Pain Quality Aching, Dull and  Sharp   Frequency constant     Pain is worst  Morning stiffness, daytime   Exacerbated by  sleeping, use   Relieved by rest   Progression Gradually worsening     ROM  Hand 2021   AROM(PROM) R L   Index MP 0/94 -5/93   PIP -3/90 -   DIP 0/67 -5    216   Long MP 0/111 0/103   PIP -16/82 0/85   DIP 0/71 0/65    253   Ring MP 0/90 0/105   PIP -11/97 -5/105   DIP 0/78 0/47    252   Small MP 0/90 0/102   PIP 0/98 0/97   DIP -15/64 0/73    272                                   Re: Tiffanie PEÑA Nathanbryandanielgretchen Shelbyilene   :   1965    Strength   (Measured in pounds)  Pain Report:  scale of 0-10/10   2021   Trials R L   1  2  3 45 42   Average 45 42     Lat Pinch 2021   Trials R L   1  2  3 14 11   Average 14 11     3 Pt Pinch 2021   Trials R L   1  2  3 7 7   Average 7 7     Assessment/Plan:  Patient presents with symptoms consistent with diagnosis of B Hand Erosive OA, with conservative intervention.     Patient's limitations or Problem List includes:  Pain, Decreased ROM/motion and Weakness of the bilateral hand which interferes with the patient's ability to perform Self Care Tasks (dressing), Recreational Activities and Household Chores as compared to previous level of function.    Rehab Potential:  Good - Return to full activity, some limitations    Patient will benefit from skilled Occupational Therapy to increase ROM, flexibility,  strength and pinch strength and decrease pain to return to previous activity level and resume normal daily tasks and to reach their rehab potential.    Barriers to Learning:  No barrier    Communication Issues:  Patient appears to be able to clearly communicate and understand verbal and written communication and follow directions correctly.    Assessment of Occupational Performance:  1-3 Performance Deficits  Identified Performance Deficits: dressing, home establishment and management and  leisure activities      Clinical Decision Making (Complexity): Low complexity    Treatment Explanation:  The following has been discussed with the patient:  RX ordered/plan of care  Anticipated outcomes  Possible risks and side effects    P: Frequency:  1x visit.  Pt is able to manage HEP independently.  Discharge [facility].    Re: Tiffanie Alcala   :   1965    Treatment Plan:  Therapeutic Exercise:  AROM, PROM, Tendon Gliding, Place and Hold, Isotonics and Isometrics  Manual Techniques:  Coordination/Dexterity, Joint mobilization and Myofascial release  Orthotic Fabrication:  Static and Finger based  Discharge Plan:  Achieve all LTG.  Independent in home treatment program.  Reach maximal therapeutic benefit.    Home Exercise Program:  Finger Active Range of Motion DIP Joint Blocking  EMR Notes  HEP - Sets 1  Reps 10  Sessions per day  Notes 3-4x/week, 5 second hold  Finger Active Range of Motion PIP Joint Blocking  EMR Notes  HEP - Sets 1  Reps 10  Sessions per day  Notes 3-4x/week, 5 second hold  Finger Active Range of Motion Tendon Glides Fist Series  EMR Notes  HEP - Sets  Reps 10  Sessions per day 3x/week  Notes hold each position 5 seconds With right pinky taped to right ring finger  Finger Passive Range of Motion Tracking for Finger Extension on Table With Assist (#2)  EMR Notes  HEP - Sets 1  Reps 30  Sessions per day  Notes 3-4x/week  Finger Passive Range of Motion Hook/IP Joint Flexion  EMR Notes  HEP - Sets 1  Reps 10  Sessions per day 3-4x/week10 second hold  Notes  Hand Strengthening Gripping  EMR Notes  HEP - Sets 1  Reps until mild fatigue (20 reps)  Sessions per day 2-3x/week  Notes    Thank you for your referral.    INQUIRIES  Therapist:Sandy Sher, OTANJU,CHT   [facility] [place] [facility]  [address] [address] [address]  Phone: [phone]  Fax: [phone]

## 2021-12-01 NOTE — PROGRESS NOTES
Hand Therapy Initial Evaluation  Current Date:  12/1/2021    Subjective:  Tiffanie Alcala is a 56 year old right hand dominant female.    Diagnosis: B Hand Erosive OA  DOI:  11/17/21 (MD order date)    Patient reports symptoms of pain, stiffness/loss of motion, weakness/loss of strength and edema of the B hands which occurred due to gradual onset. Since onset symptoms are gradually getting worse. Special tests:  OA testing.  Previous treatment: Hand Therapy here. General health as reported by patient is excellent.  Pertinent medical history includes: Anemia, Menopausal, Osteoarthritis.  Medical allergies: Sulfa.  Surgical history: none.  Medication history: None.    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  Prior functional level:  no limitations  Barriers include:none  Mobility: No difficulty  Transportation: drives  Leisure activities/hobbies: tennis, reading, gardening, piano    Upper Extremity Functional Index Score:  SCORE:   Column Totals: /80: 76   (A lower score indicates greater disability.)    Pain Level (Scale 0-10):   12/1/2021   At Rest 1-2   With Use 3 after cortisone, 6 prior to cortisone     Pain Description:  Date 12/1/2021   Location B Index and Long, R small finger, B thumbs   Pain Quality Aching, Dull and Sharp   Frequency constant     Pain is worst  Morning stiffness, daytime   Exacerbated by  sleeping, use   Relieved by rest   Progression Gradually worsening     ROM  Hand 12/1/2021 12/1/2021   AROM(PROM) R L   Index MP 0/94 -5/93   PIP -3/90 -7/73   DIP 0/67 -5/65    216   Long MP 0/111 0/103   PIP -16/82 0/85   DIP 0/71 0/65    253   Ring MP 0/90 0/105   PIP -11/97 -5/105   DIP 0/78 0/47    252   Small MP 0/90 0/102   PIP 0/98 0/97   DIP -15/64 0/73    272     Strength   (Measured in pounds)  Pain Report:  scale of 0-10/10   12/1/2021 12/1/2021    Trials R L   1  2  3 45 42   Average 45 42     Lat Pinch 12/1/2021 12/1/2021   Trials R L   1  2  3 14 11   Average 14 11     3 Pt Pinch 12/1/2021 12/1/2021   Trials R L   1  2  3 7 7   Average 7 7     Assessment/Plan:  Patient presents with symptoms consistent with diagnosis of B Hand Erosive OA, with conservative intervention.     Patient's limitations or Problem List includes:  Pain, Decreased ROM/motion and Weakness of the bilateral hand which interferes with the patient's ability to perform Self Care Tasks (dressing), Recreational Activities and Household Chores as compared to previous level of function.    Rehab Potential:  Good - Return to full activity, some limitations    Patient will benefit from skilled Occupational Therapy to increase ROM, flexibility,  strength and pinch strength and decrease pain to return to previous activity level and resume normal daily tasks and to reach their rehab potential.    Barriers to Learning:  No barrier    Communication Issues:  Patient appears to be able to clearly communicate and understand verbal and written communication and follow directions correctly.    Assessment of Occupational Performance:  1-3 Performance Deficits  Identified Performance Deficits: dressing, home establishment and management and leisure activities      Clinical Decision Making (Complexity): Low complexity    Treatment Explanation:  The following has been discussed with the patient:  RX ordered/plan of care  Anticipated outcomes  Possible risks and side effects    P: Frequency:  1x visit.  Pt is able to manage HEP independently.  Discharge [facility].    Treatment Plan:  Therapeutic Exercise:  AROM, PROM, Tendon Gliding, Place and Hold, Isotonics and Isometrics  Manual Techniques:  Coordination/Dexterity, Joint mobilization and Myofascial release  Orthotic Fabrication:  Static and Finger based  Discharge Plan:  Achieve all LTG.  Independent in home treatment program.  Reach maximal therapeutic  benefit.    Home Exercise Program:  Finger Active Range of Motion DIP Joint Blocking  EMR Notes  HEP - Sets 1  Reps 10  Sessions per day  Notes 3-4x/week, 5 second hold  Finger Active Range of Motion PIP Joint Blocking  EMR Notes  HEP - Sets 1  Reps 10  Sessions per day  Notes 3-4x/week, 5 second hold  Finger Active Range of Motion Tendon Glides Fist Series  EMR Notes  HEP - Sets  Reps 10  Sessions per day 3x/week  Notes hold each position 5 seconds With right pinky taped to right ring finger  Finger Passive Range of Motion Tracking for Finger Extension on Table With Assist (#2)  EMR Notes  HEP - Sets 1  Reps 30  Sessions per day  Notes 3-4x/week  Finger Passive Range of Motion Hook/IP Joint Flexion  EMR Notes  HEP - Sets 1  Reps 10  Sessions per day 3-4x/week10 second hold  Notes  Hand Strengthening Gripping  EMR Notes  HEP - Sets 1  Reps until mild fatigue (20 reps)  Sessions per day 2-3x/week  Notes

## 2021-12-05 PROBLEM — M79.641 PAIN IN BOTH HANDS: Status: RESOLVED | Noted: 2018-12-19 | Resolved: 2021-12-05

## 2021-12-05 PROBLEM — M15.4 EROSIVE OSTEOARTHRITIS OF HANDS, BILATERAL: Status: RESOLVED | Noted: 2021-12-05 | Resolved: 2021-12-05

## 2021-12-05 PROBLEM — M15.4 EROSIVE OSTEOARTHRITIS OF HANDS, BILATERAL: Status: ACTIVE | Noted: 2021-12-05

## 2021-12-05 PROBLEM — M79.642 PAIN IN BOTH HANDS: Status: RESOLVED | Noted: 2018-12-19 | Resolved: 2021-12-05

## 2021-12-05 PROBLEM — M79.642 PAIN IN BOTH HANDS: Status: ACTIVE | Noted: 2018-12-19

## 2021-12-05 PROBLEM — M79.641 PAIN IN BOTH HANDS: Status: ACTIVE | Noted: 2018-12-19

## 2021-12-26 ENCOUNTER — HEALTH MAINTENANCE LETTER (OUTPATIENT)
Age: 56
End: 2021-12-26

## 2022-10-23 ENCOUNTER — HEALTH MAINTENANCE LETTER (OUTPATIENT)
Age: 57
End: 2022-10-23

## 2023-11-11 ENCOUNTER — HEALTH MAINTENANCE LETTER (OUTPATIENT)
Age: 58
End: 2023-11-11

## 2024-12-30 ENCOUNTER — THERAPY VISIT (OUTPATIENT)
Dept: PHYSICAL THERAPY | Facility: CLINIC | Age: 59
End: 2024-12-30
Payer: COMMERCIAL

## 2024-12-30 DIAGNOSIS — M25.551 HIP PAIN, RIGHT: ICD-10-CM

## 2024-12-30 DIAGNOSIS — G57.01 PIRIFORMIS SYNDROME, RIGHT: ICD-10-CM

## 2024-12-30 DIAGNOSIS — M54.50 ACUTE RIGHT-SIDED LOW BACK PAIN WITHOUT SCIATICA: Primary | ICD-10-CM

## 2024-12-30 PROCEDURE — 97110 THERAPEUTIC EXERCISES: CPT | Mod: GP | Performed by: PHYSICAL THERAPIST

## 2024-12-30 PROCEDURE — 97161 PT EVAL LOW COMPLEX 20 MIN: CPT | Mod: GP | Performed by: PHYSICAL THERAPIST

## 2024-12-30 NOTE — PROGRESS NOTES
PHYSICAL THERAPY EVALUATION  Type of Visit: Evaluation        Fall Risk Screen:  Fall screen completed by: PT  Have you fallen 2 or more times in the past year?: No  Have you fallen and had an injury in the past year?: No  Is patient a fall risk?: No    Subjective         Presenting condition or subjective complaint: (Patient-Rptd) I strained a muscle in my lower back, right side    Strained muscle while playing pickleball about 2 weeks ago. Some history of pain in same area in past.     Pain is worse in the morning, pain is constant, varies in intensity. Prolonged sitting; flexion or leaning forward seems to bother.    Walking is alright, some pain after.    Had one online visit of PT and got some stretches and pelvic tilt exercise.  Normally plays pickle ball and tennis 4x/week; swims 1x/week.    Date of onset: 12/15/24    Relevant medical history: (Patient-Rptd) Arthritis; Osteoarthritis; Rheumatoid arthritis   Dates & types of surgery:      Prior diagnostic imaging/testing results:       Prior therapy history for the same diagnosis, illness or injury: (Patient-Rptd) Yes (Patient-Rptd) I saw a PT through [email or web address] online last Monday and have been doing exercises.        Living Environment  Social support: (Patient-Rptd) With a significant other or spouse   Type of home: (Patient-Rptd) House   Stairs to enter the home: (Patient-Rptd) Yes   Is there a railing: (Patient-Rptd) Yes     Ramp: (Patient-Rptd) No   Stairs inside the home: (Patient-Rptd) Yes (Patient-Rptd) 12 Is there a railing: (Patient-Rptd) Yes     Help at home:    Equipment owned:       Employment: (Patient-Rptd) Yes (Patient-Rptd) /Career counselor  Hobbies/Interests: (Patient-Rptd) pickleball, tennis, walking/running, swimming    Patient goals for therapy: (Patient-Rptd) play pickleball and tennis and engage in other athletic activities without pain or worrying about further injury.         Objective   LUMBAR SPINE EVALUATION  PAIN: Pain  Location: right lower lumbar and upper to mid gluteal region  Pain Frequency: intermittent  Pain is Exacerbated By: bending, twisting, prolonged sitting, standing, walking  Pain is Relieved By: rest and change in position    POSTURE: WNL  GAIT:   Weightbearing Status: WBAT  Assistive Device(s): None  Gait Deviations: WNL  BALANCE/PROPRIOCEPTION: Single Leg Stance Eyes Open (seconds): 30 sec right and left  WEIGHTBEARING ALIGNMENT: WNL     ROM:   (Degrees) Left AROM Left PROM  Right AROM Right PROM   Hip Flexion WNL  WNL    Hip Extension       Hip Abduction WNL  WNL    Hip Adduction       Hip Internal Rotation 20  20    Hip External Rotation WNL  WNL    Knee Flexion       Knee Extension       Lumbar Side glide WNL WNL   Lumbar Flexion Finger tips to ankle level, end range pain   Lumbar Extension Minimal limitation but gets more motion at hips or upper back   PELVIC/SI SCREEN: WNL  STRENGTH:  right hip abduction glut med 5-/5; left 5/5 right hip flexion 5-/5 with slight pain- left 5/5.    MYOTOMES: WNL    NEURAL TENSION: Lumbar WNL  FLEXIBILITY: WNL; end range tightness in prone with lumbar extension; piriformis tightness right > left  LUMBAR/HIP Special Tests: WNL   PELVIS/SI SPECIAL TESTS: WNL  FUNCTIONAL TESTS:  squat with good form with minimal hip IR and knee valgus  PALPATION: WNL      Assessment & Plan   CLINICAL IMPRESSIONS  Medical Diagnosis:      Treatment Diagnosis: Acute right sided low back pain without sciatica; piriformis syndrome, right; acute right hip pain   Impression/Assessment: Patient is a 59 year old female with right low back and upper buttock complaints.  The following significant findings have been identified: Pain, Decreased ROM/flexibility, Decreased joint mobility, Decreased strength, Impaired muscle performance, and Decreased activity tolerance. These impairments interfere with their ability to perform recreational activities, household chores, household mobility, and community mobility  as compared to previous level of function.     Clinical Decision Making (Complexity):  Clinical Presentation: Stable/Uncomplicated  Clinical Presentation Rationale: based on medical and personal factors listed in PT evaluation  Clinical Decision Making (Complexity): Low complexity    PLAN OF CARE  Treatment Interventions:  Interventions: Manual Therapy, Neuromuscular Re-education, Therapeutic Activity, Therapeutic Exercise, Self-Care/Home Management    Long Term Goals     PT Goal 1  Goal Identifier: Ambulation  Goal Description: Minutes patient will be able to  walk; on uneven terrain; on sharp inclines/declines; Able to make sharp turns; Ambulate without gait deviation 30 min 0/10 pain  Rationale: to maximize safety and independence with performance of ADLs and functional tasks;to maximize safety and independence within the home;to maximize safety and independence within the community;to maximize safety and independence with transportation;to maximize safety and independence with self cares  Target Date: 02/24/25  PT Goal 2  Goal Identifier: Recreational activities  Goal Description: resume recreational activities such as exercise routine, pickle ball, and swimming all with pain 0/10  Rationale: to maximize safety and independence with performance of ADLs and functional tasks;to maximize safety and independence within the community  Target Date: 02/24/25      Frequency of Treatment: 1x/week  Duration of Treatment: 8 weeks      Education Assessment:   Learner/Method: Patient;Pictures/Video;No Barriers to Learning  Education Comments: Educated pt on findings of the evaluation and reasoning for plan of care.  Pt was able to understand how treatment plan aligns with goals.    Risks and benefits of evaluation/treatment have been explained.   Patient/Family/caregiver agrees with Plan of Care.     Evaluation Time:     PT Eval, Low Complexity Minutes (23803): 18       Signing Clinician: Drea Badillo PT